# Patient Record
(demographics unavailable — no encounter records)

---

## 2025-06-26 NOTE — HEALTH RISK ASSESSMENT
[Good] : ~his/her~  mood as  good [Yes] : Yes [Little interest or pleasure doing things] : 1) Little interest or pleasure doing things [Feeling down, depressed, or hopeless] : 2) Feeling down, depressed, or hopeless [0] : 2) Feeling down, depressed, or hopeless: Not at all (0) [PHQ-2 Negative - No further assessment needed] : PHQ-2 Negative - No further assessment needed [With Family] : lives with family [# of Members in Household ___] :  household currently consist of [unfilled] member(s) [Employed] : employed [High School] : high school [Single] : single [# Of Children ___] : has [unfilled] children [Feels Safe at Home] : Feels safe at home [Former] : Former [0-4] : 0-4 [< 15 Years] : < 15 Years [Sexually Active] : sexually active [Monthly or less (1 pt)] : Monthly or less (1 point) [1 or 2 (0 pts)] : 1 or 2 (0 points) [Never (0 pts)] : Never (0 points) [de-identified] : Socially.  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 [[id number]] : 0 [de-identified] : Admits his diet could be better.  [de-identified] : Lives with his mother.  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

## 2025-06-26 NOTE — HISTORY OF PRESENT ILLNESS
[de-identified] : Mr. KIARA LOU is a 29 year old male with hx of s/p appendectomy, presenting for an initial evaluation and annual physical.   Pt states he is feeling well. Offers no complaints. Denies any SOB, CP, abdominal pain, N/V/D, headache, dizziness, or leg swelling.

## 2025-06-26 NOTE — ADDENDUM
[FreeTextEntry1] : Documented by Karlie Saldana acting as a scribe for Dr. Melanie Jensen. 06/26/2025  All medical records entries made by the scribe were at my, Dr. Jensen, direction and personally dictated by me on 06/26/2025. I have reviewed the chart and agree that the record accurately reflects my personal performance of the history, physical exam, assessment and plan. I have also personally directed, reviewed, and agreed with the chart.